# Patient Record
Sex: MALE | Employment: FULL TIME | URBAN - METROPOLITAN AREA
[De-identification: names, ages, dates, MRNs, and addresses within clinical notes are randomized per-mention and may not be internally consistent; named-entity substitution may affect disease eponyms.]

---

## 2024-03-25 ENCOUNTER — LAB (OUTPATIENT)
Dept: LAB | Facility: LAB | Age: 24
End: 2024-03-25
Payer: COMMERCIAL

## 2024-03-25 ENCOUNTER — OFFICE VISIT (OUTPATIENT)
Dept: PRIMARY CARE | Facility: CLINIC | Age: 24
End: 2024-03-25
Payer: COMMERCIAL

## 2024-03-25 ENCOUNTER — HOSPITAL ENCOUNTER (OUTPATIENT)
Dept: RADIOLOGY | Facility: CLINIC | Age: 24
Discharge: HOME | End: 2024-03-25
Payer: COMMERCIAL

## 2024-03-25 VITALS
WEIGHT: 224 LBS | HEIGHT: 71 IN | BODY MASS INDEX: 31.36 KG/M2 | SYSTOLIC BLOOD PRESSURE: 126 MMHG | DIASTOLIC BLOOD PRESSURE: 82 MMHG | OXYGEN SATURATION: 98 % | HEART RATE: 87 BPM

## 2024-03-25 DIAGNOSIS — R06.09 DYSPNEA ON EXERTION: ICD-10-CM

## 2024-03-25 DIAGNOSIS — N48.5 ULCER OF PENIS: ICD-10-CM

## 2024-03-25 DIAGNOSIS — Z00.00 HEALTH CARE MAINTENANCE: ICD-10-CM

## 2024-03-25 DIAGNOSIS — Z00.00 HEALTH CARE MAINTENANCE: Primary | ICD-10-CM

## 2024-03-25 DIAGNOSIS — G47.19 DAYTIME HYPERSOMNOLENCE: ICD-10-CM

## 2024-03-25 LAB
ALBUMIN SERPL BCP-MCNC: 4.6 G/DL (ref 3.4–5)
ALP SERPL-CCNC: 83 U/L (ref 33–120)
ALT SERPL W P-5'-P-CCNC: 21 U/L (ref 10–52)
ANION GAP SERPL CALC-SCNC: 13 MMOL/L (ref 10–20)
APPEARANCE UR: CLEAR
AST SERPL W P-5'-P-CCNC: 16 U/L (ref 9–39)
BASOPHILS # BLD AUTO: 0.02 X10*3/UL (ref 0–0.1)
BASOPHILS NFR BLD AUTO: 0.3 %
BILIRUB SERPL-MCNC: 0.7 MG/DL (ref 0–1.2)
BILIRUB UR STRIP.AUTO-MCNC: NEGATIVE MG/DL
BUN SERPL-MCNC: 9 MG/DL (ref 6–23)
CALCIUM SERPL-MCNC: 10 MG/DL (ref 8.6–10.6)
CHLORIDE SERPL-SCNC: 103 MMOL/L (ref 98–107)
CHOLEST SERPL-MCNC: 207 MG/DL (ref 0–199)
CHOLESTEROL/HDL RATIO: 4.7
CO2 SERPL-SCNC: 29 MMOL/L (ref 21–32)
COLOR UR: YELLOW
CREAT SERPL-MCNC: 0.97 MG/DL (ref 0.5–1.3)
EGFRCR SERPLBLD CKD-EPI 2021: >90 ML/MIN/1.73M*2
EOSINOPHIL # BLD AUTO: 0.04 X10*3/UL (ref 0–0.7)
EOSINOPHIL NFR BLD AUTO: 0.6 %
ERYTHROCYTE [DISTWIDTH] IN BLOOD BY AUTOMATED COUNT: 12.8 % (ref 11.5–14.5)
EST. AVERAGE GLUCOSE BLD GHB EST-MCNC: 105 MG/DL
GLUCOSE SERPL-MCNC: 105 MG/DL (ref 74–99)
GLUCOSE UR STRIP.AUTO-MCNC: NORMAL MG/DL
HBA1C MFR BLD: 5.3 %
HBV CORE IGM SER QL: NONREACTIVE
HBV SURFACE AB SER-ACNC: 402 MIU/ML
HBV SURFACE AG SERPL QL IA: NONREACTIVE
HCT VFR BLD AUTO: 43.4 % (ref 41–52)
HCV AB SER QL: REACTIVE
HDLC SERPL-MCNC: 43.6 MG/DL
HERPES SIMPLEX VIRUS 1 IGG: >8 INDEX
HERPES SIMPLEX VIRUS 2 IGG: >8 INDEX
HGB BLD-MCNC: 15.1 G/DL (ref 13.5–17.5)
HIV 1+2 AB+HIV1 P24 AG SERPL QL IA: NONREACTIVE
IMM GRANULOCYTES # BLD AUTO: 0.01 X10*3/UL (ref 0–0.7)
IMM GRANULOCYTES NFR BLD AUTO: 0.2 % (ref 0–0.9)
KETONES UR STRIP.AUTO-MCNC: NEGATIVE MG/DL
LDLC SERPL CALC-MCNC: 136 MG/DL
LEUKOCYTE ESTERASE UR QL STRIP.AUTO: NEGATIVE
LYMPHOCYTES # BLD AUTO: 1.76 X10*3/UL (ref 1.2–4.8)
LYMPHOCYTES NFR BLD AUTO: 26.7 %
MCH RBC QN AUTO: 29.1 PG (ref 26–34)
MCHC RBC AUTO-ENTMCNC: 34.8 G/DL (ref 32–36)
MCV RBC AUTO: 84 FL (ref 80–100)
MONOCYTES # BLD AUTO: 0.55 X10*3/UL (ref 0.1–1)
MONOCYTES NFR BLD AUTO: 8.3 %
MUCOUS THREADS #/AREA URNS AUTO: NORMAL /LPF
NEUTROPHILS # BLD AUTO: 4.21 X10*3/UL (ref 1.2–7.7)
NEUTROPHILS NFR BLD AUTO: 63.9 %
NITRITE UR QL STRIP.AUTO: NEGATIVE
NON HDL CHOLESTEROL: 163 MG/DL (ref 0–149)
NRBC BLD-RTO: 0 /100 WBCS (ref 0–0)
PH UR STRIP.AUTO: 5.5 [PH]
PLATELET # BLD AUTO: 208 X10*3/UL (ref 150–450)
POTASSIUM SERPL-SCNC: 4 MMOL/L (ref 3.5–5.3)
PROT SERPL-MCNC: 7.7 G/DL (ref 6.4–8.2)
PROT UR STRIP.AUTO-MCNC: NORMAL MG/DL
RBC # BLD AUTO: 5.19 X10*6/UL (ref 4.5–5.9)
RBC # UR STRIP.AUTO: NEGATIVE /UL
RBC #/AREA URNS AUTO: NORMAL /HPF
SODIUM SERPL-SCNC: 141 MMOL/L (ref 136–145)
SP GR UR STRIP.AUTO: 1.03
T4 FREE SERPL-MCNC: 1.3 NG/DL (ref 0.78–1.48)
TREPONEMA PALLIDUM IGG+IGM AB [PRESENCE] IN SERUM OR PLASMA BY IMMUNOASSAY: NONREACTIVE
TRIGL SERPL-MCNC: 137 MG/DL (ref 0–149)
TSH SERPL-ACNC: 1.03 MIU/L (ref 0.44–3.98)
UROBILINOGEN UR STRIP.AUTO-MCNC: NORMAL MG/DL
VLDL: 27 MG/DL (ref 0–40)
WBC # BLD AUTO: 6.6 X10*3/UL (ref 4.4–11.3)
WBC #/AREA URNS AUTO: NORMAL /HPF

## 2024-03-25 PROCEDURE — 86038 ANTINUCLEAR ANTIBODIES: CPT

## 2024-03-25 PROCEDURE — 80061 LIPID PANEL: CPT

## 2024-03-25 PROCEDURE — 85025 COMPLETE CBC W/AUTO DIFF WBC: CPT

## 2024-03-25 PROCEDURE — 86706 HEP B SURFACE ANTIBODY: CPT

## 2024-03-25 PROCEDURE — 81001 URINALYSIS AUTO W/SCOPE: CPT

## 2024-03-25 PROCEDURE — 71046 X-RAY EXAM CHEST 2 VIEWS: CPT | Performed by: RADIOLOGY

## 2024-03-25 PROCEDURE — 87522 HEPATITIS C REVRS TRNSCRPJ: CPT

## 2024-03-25 PROCEDURE — 87389 HIV-1 AG W/HIV-1&-2 AB AG IA: CPT

## 2024-03-25 PROCEDURE — 87800 DETECT AGNT MULT DNA DIREC: CPT

## 2024-03-25 PROCEDURE — 86780 TREPONEMA PALLIDUM: CPT

## 2024-03-25 PROCEDURE — 87086 URINE CULTURE/COLONY COUNT: CPT

## 2024-03-25 PROCEDURE — 36415 COLL VENOUS BLD VENIPUNCTURE: CPT

## 2024-03-25 PROCEDURE — 83036 HEMOGLOBIN GLYCOSYLATED A1C: CPT

## 2024-03-25 PROCEDURE — 86695 HERPES SIMPLEX TYPE 1 TEST: CPT

## 2024-03-25 PROCEDURE — 87340 HEPATITIS B SURFACE AG IA: CPT

## 2024-03-25 PROCEDURE — 84439 ASSAY OF FREE THYROXINE: CPT

## 2024-03-25 PROCEDURE — 86803 HEPATITIS C AB TEST: CPT

## 2024-03-25 PROCEDURE — 86705 HEP B CORE ANTIBODY IGM: CPT

## 2024-03-25 PROCEDURE — 99204 OFFICE O/P NEW MOD 45 MIN: CPT | Performed by: INTERNAL MEDICINE

## 2024-03-25 PROCEDURE — 71046 X-RAY EXAM CHEST 2 VIEWS: CPT

## 2024-03-25 PROCEDURE — 80053 COMPREHEN METABOLIC PANEL: CPT

## 2024-03-25 PROCEDURE — 1036F TOBACCO NON-USER: CPT | Performed by: INTERNAL MEDICINE

## 2024-03-25 PROCEDURE — 84443 ASSAY THYROID STIM HORMONE: CPT

## 2024-03-25 PROCEDURE — 86696 HERPES SIMPLEX TYPE 2 TEST: CPT

## 2024-03-25 RX ORDER — FLUCONAZOLE 150 MG/1
TABLET ORAL
COMMUNITY
Start: 2024-03-19

## 2024-03-25 RX ORDER — KETOCONAZOLE 20 MG/ML
SHAMPOO, SUSPENSION TOPICAL
COMMUNITY
Start: 2024-03-19

## 2024-03-25 RX ORDER — VALACYCLOVIR HYDROCHLORIDE 1 G/1
1000 TABLET, FILM COATED ORAL 2 TIMES DAILY
Qty: 14 TABLET | Refills: 2 | Status: SHIPPED | OUTPATIENT
Start: 2024-03-25 | End: 2024-04-01

## 2024-03-25 RX ORDER — FLUOCINOLONE ACETONIDE 0.11 MG/ML
OIL TOPICAL
COMMUNITY
Start: 2024-03-16

## 2024-03-25 ASSESSMENT — PATIENT HEALTH QUESTIONNAIRE - PHQ9
SUM OF ALL RESPONSES TO PHQ9 QUESTIONS 1 AND 2: 0
2. FEELING DOWN, DEPRESSED OR HOPELESS: NOT AT ALL
1. LITTLE INTEREST OR PLEASURE IN DOING THINGS: NOT AT ALL

## 2024-03-25 NOTE — PROGRESS NOTES
Subjective   Patient ID: Jona Drew is a 23 y.o. male who presents for New Patient Visit, Fatigue (Would like to discuss possible sleep apnea.), and std testing.  HPI          Subjective   Patient ID: Jona Drew is a 23 y.o. male who presents for New Patient Visit, Fatigue (Would like to discuss possible sleep apnea.), and std testing.  HPI  Past medical history chronic decreased vision in the right eye amblyopia    Patient describes an ulcer on the distal part of his penis for several weeks getting better with time grade 2 out of 10 worse when he tries to retract his foreskin causes some pain this is gotten better though no pain presently  He is sexually active with multiple partners sometimes uses protection  Denies any discharge genital dysuria hematuria change in frequency abdominal pain nausea vomiting              Health Maintenance:      Colonoscopy:      Mammogram:      Pelvic/Pap:      Low dose chest CT:      Aorta duplex:      Optho:      Podiatry:        Vaccines:      Refer to Epic Vaccination Log        ROS:      General: denies fever/chills/weight loss      Head: denies HA/trauma/masses/dizziness      Eyes: Chronic decreased vision on the right eye since a child denies vision change/loss of vision/blurry vision/diplopia/eye pain      Ears: denies hearing loss/tinnitus/otalgia/otorrhea      Nose: denies nasal drainage/anosmia      Throat: denies dysphagia/odynophagia      Lymphatics: denies lymph node swelling      Breast: denies masses/discharge/dimpling/skin changes      Cardiac: denies CP/palpitations/orthopnea/PND      Pulmonary: Apnea episodes at night reported ; occasional dyspnea with exertion denies dyspnea/cough/wheezing      GI: denies abd pain/n/v/diarrhea/melena/hematochezia/hematemesis      : denies dysuria/hematuria/change frequency      Genital: Ulcer on his penis getting a bit better with time denies genital discharge/lesions      Skin: Dryness of his hands denies  "rashes/lesions/masses      MSK: Occasionally feels a popping sensation in the right knee denies weakness/swelling/edema/gait imbalance/pain      Neuro: denies paresthesias/seizures/dysarthria      Psych: denies depression/anxiety/suicidal or homicidal ideations            Objective   /82   Pulse 87   Ht 1.803 m (5' 11\")   Wt 102 kg (224 lb)   SpO2 98%   BMI 31.24 kg/m²      Physical Exam:     General: AO3, NAD     Head: atraumatic/NC     Eyes: EOMI/PERRLA. Negative APD     Ears: TM pearly gray, EAC clear. No lesions or erythema     Nose: symmetric nares, no discharge     Throat: trachea midline, uvula midline pink mucosa. No thyromegaly     Lymphatics: no cervical/supraclavicular/ant or posterior cervical adenopathy/axillary/inguinal adenopathy     Breast: not examined     Chest: no deformity or tenderness to palpation     Pulm: CTA b/l, no wheeze/rhonchi/rales. nonlabored     Cardiac: RRR +s1s2, no m/r/g.      GI: soft, NT/ND. Normoactive Bsx4. No rebound/guarding.     Rectal: not examined     Genital: Distal dorsal shaft of penis 1 mm superficial ulceration without surrounding erythema warmth     MSK: 5/5 strength UE LE. No edema/clubbing/cyanosis     Skin: no rashes/lesions     Vascular: 2+ palp DP PT radials b/l. Negative carotid bruit     Neuro: CNII-XII intact. No focal deficits. Reflexes 2/4 brachioradialis bicep tricep patellar achilles. Finger to nose intact.     Psych: appropriate mood/affect                    No results found for: \"BMPR1A\", \"CBCDIF\"      Assessment/Plan   Diagnoses and all orders for this visit:  Health care maintenance  -     CBC and Auto Differential; Future  -     Comprehensive Metabolic Panel; Future  -     Hemoglobin A1C; Future  -     Lipid Panel; Future  -     Thyroxine, Free; Future  -     Thyroid Stimulating Hormone; Future  -     Urinalysis with Reflex Microscopic; Future  -     Urine Culture; Future  -     HSV1 IgG and HSV2 IgG; Future  -     Syphilis Screen with " Reflex; Future  -     C. Trachomatis / N. Gonorrhoeae, Amplified Detection; Future  -     Hepatitis B Core Antibody, Igm; Future  -     Hepatitis B Surface Antigen; Future  -     Hepatitis B Surface Antibody; Future  -     Hepatitis C Antibody; Future  -     HIV 1/2 Antigen/Antibody Screen with Reflex to Confirmation; Future  -     HSV PCR, Skin/Mucosa; Future  -     KATTY with Reflex to HARRY; Future  Daytime hypersomnolence  Comments:  Rule out ERIKA  Orders:  -     In-Center Sleep Study (Non-Sleep Provider Only); Future  -     Referral to Adult Sleep Medicine; Future  Dyspnea on exertion  Comments:  Likely secondary to marijuana use Cronkhite is worse other  Orders:  -     XR chest 2 views; Future  -     Transthoracic Echo (TTE) Complete; Future  -     Complete Pulmonary Function Test Pre/Post Bronchodialator (Spirometry Pre/Post/DLCO/Lung Volumes); Future  Ulcer of penis  Comments:  Suspect HSV less likely other such as syphilis  Orders:  -     valACYclovir (Valtrex) 1 gram tablet; Take 1 tablet (1,000 mg) by mouth 2 times a day for 7 days.    Use lotion on the hands twice a day    Call follow-up with ophthalmologist    Thank you for making appointment today Jona    Please follow-up 3 months    Jese Ferreira DO, [facility]      Health Maintenance:      Colonoscopy:      Mammogram:      Pelvic/Pap:      Low dose chest CT:      Aorta duplex:      Optho:      Podiatry:        Vaccines:      Refer to Epic Vaccination Log        ROS:      General: denies fever/chills/weight loss      Head: denies HA/trauma/masses/dizziness      Eyes: denies vision change/loss of vision/blurry vision/diplopia/eye pain      Ears: denies hearing loss/tinnitus/otalgia/otorrhea      Nose: denies nasal drainage/anosmia      Throat: denies dysphagia/odynophagia      Lymphatics: denies lymph node swelling      Breast: denies masses/discharge/dimpling/skin changes      Cardiac: denies CP/palpitations/orthopnea/PND      Pulmonary: denies  "dyspnea/cough/wheezing      GI: denies abd pain/n/v/diarrhea/melena/hematochezia/hematemesis      : denies dysuria/hematuria/change frequency      Genital: denies genital discharge/lesions      Skin: denies rashes/lesions/masses      MSK: denies weakness/swelling/edema/gait imbalance/pain      Neuro: denies paresthesias/seizures/dysarthria      Psych: denies depression/anxiety/suicidal or homicidal ideations            Objective   /84   Pulse 110   Ht 1.803 m (5' 11\")   Wt 102 kg (224 lb)   SpO2 98%   BMI 31.24 kg/m²      Physical Exam:     General: AO3, NAD     Head: atraumatic/NC     Eyes: EOMI/PERRLA. Negative APD     Ears: TM pearly gray, EAC clear. No lesions or erythema     Nose: symmetric nares, no discharge     Throat: trachea midline, uvula midline pink mucosa. No thyromegaly     Lymphatics: no cervical/supraclavicular/ant or posterior cervical adenopathy/axillary/inguinal adenopathy     Breast: not examined     Chest: no deformity or tenderness to palpation     Pulm: CTA b/l, no wheeze/rhonchi/rales. nonlabored     Cardiac: RRR +s1s2, no m/r/g.      GI: soft, NT/ND. Normoactive Bsx4. No rebound/guarding.     Rectal: not examined     Genital: not examined     MSK: 5/5 strength UE LE. No edema/clubbing/cyanosis     Skin: no rashes/lesions     Vascular: 2+ palp DP PT radials b/l. Negative carotid bruit     Neuro: CNII-XII intact. No focal deficits. Reflexes 2/4 brachioradialis bicep tricep patellar achilles. Finger to nose intact.     Psych: appropriate mood/affect                    No results found for: \"BMPR1A\", \"CBCDIF\"      Assessment/Plan   Diagnoses and all orders for this visit:  Health care maintenance  -     CBC and Auto Differential; Future  -     Comprehensive Metabolic Panel; Future  -     Hemoglobin A1C; Future  -     Lipid Panel; Future  -     Thyroxine, Free; Future  -     Thyroid Stimulating Hormone; Future  -     Urinalysis with Reflex Microscopic; Future  -     Urine Culture; " Future  -     HSV1 IgG and HSV2 IgG; Future  -     Syphilis Screen with Reflex; Future  -     C. Trachomatis / N. Gonorrhoeae, Amplified Detection; Future  -     Hepatitis B Core Antibody, Igm; Future  -     Hepatitis B Surface Antigen; Future  -     Hepatitis B Surface Antibody; Future  -     Hepatitis C Antibody; Future  -     HIV 1/2 Antigen/Antibody Screen with Reflex to Confirmation; Future  -     HSV PCR, Skin/Mucosa; Future  -     KATTY with Reflex to HARRY; Future  Daytime hypersomnolence  Comments:  Rule out ERIKA  Orders:  -     In-Center Sleep Study (Non-Sleep Provider Only); Future  -     Referral to Adult Sleep Medicine; Future  Dyspnea on exertion  Comments:  Likely secondary to marijuana use Cronkhite is worse other  Orders:  -     XR chest 2 views; Future  -     Transthoracic Echo (TTE) Complete; Future  -     Complete Pulmonary Function Test Pre/Post Bronchodialator (Spirometry Pre/Post/DLCO/Lung Volumes); Future  Ulcer of penis  Comments:  Suspect HSV less likely other such as syphilis  Orders:  -     valACYclovir (Valtrex) 1 gram tablet; Take 1 tablet (1,000 mg) by mouth 2 times a day for 7 days.           Ketty Archer MA

## 2024-03-25 NOTE — PROGRESS NOTES
Subjective   Patient ID: Jona Drew is a 23 y.o. male who presents for New Patient Visit, Fatigue (Would like to discuss possible sleep apnea.), and std testing.  HPI  Past medical history chronic decreased vision in the right eye amblyopia    Patient describes an ulcer on the distal part of his penis for several weeks getting better with time grade 2 out of 10 worse when he tries to retract his foreskin causes some pain this is gotten better though no pain presently  He is sexually active with multiple partners sometimes uses protection  Denies any discharge genital dysuria hematuria change in frequency abdominal pain nausea vomiting              Health Maintenance:      Colonoscopy:      Mammogram:      Pelvic/Pap:      Low dose chest CT:      Aorta duplex:      Optho:      Podiatry:        Vaccines:      Refer to Epic Vaccination Log        ROS:      General: denies fever/chills/weight loss      Head: denies HA/trauma/masses/dizziness      Eyes: Chronic decreased vision on the right eye since a child denies vision change/loss of vision/blurry vision/diplopia/eye pain      Ears: denies hearing loss/tinnitus/otalgia/otorrhea      Nose: denies nasal drainage/anosmia      Throat: denies dysphagia/odynophagia      Lymphatics: denies lymph node swelling      Breast: denies masses/discharge/dimpling/skin changes      Cardiac: denies CP/palpitations/orthopnea/PND      Pulmonary: Apnea episodes at night reported ; occasional dyspnea with exertion denies dyspnea/cough/wheezing      GI: denies abd pain/n/v/diarrhea/melena/hematochezia/hematemesis      : denies dysuria/hematuria/change frequency      Genital: Ulcer on his penis getting a bit better with time denies genital discharge/lesions      Skin: Dryness of his hands denies rashes/lesions/masses      MSK: Occasionally feels a popping sensation in the right knee denies weakness/swelling/edema/gait imbalance/pain      Neuro: denies paresthesias/seizures/dysarthria       "Psych: denies depression/anxiety/suicidal or homicidal ideations            Objective   /82   Pulse 87   Ht 1.803 m (5' 11\")   Wt 102 kg (224 lb)   SpO2 98%   BMI 31.24 kg/m²      Physical Exam:     General: AO3, NAD     Head: atraumatic/NC     Eyes: EOMI/PERRLA. Negative APD     Ears: TM pearly gray, EAC clear. No lesions or erythema     Nose: symmetric nares, no discharge     Throat: trachea midline, uvula midline pink mucosa. No thyromegaly     Lymphatics: no cervical/supraclavicular/ant or posterior cervical adenopathy/axillary/inguinal adenopathy     Breast: not examined     Chest: no deformity or tenderness to palpation     Pulm: CTA b/l, no wheeze/rhonchi/rales. nonlabored     Cardiac: RRR +s1s2, no m/r/g.      GI: soft, NT/ND. Normoactive Bsx4. No rebound/guarding.     Rectal: not examined     Genital: Distal dorsal shaft of penis 1 mm superficial ulceration without surrounding erythema warmth     MSK: 5/5 strength UE LE. No edema/clubbing/cyanosis     Skin: no rashes/lesions     Vascular: 2+ palp DP PT radials b/l. Negative carotid bruit     Neuro: CNII-XII intact. No focal deficits. Reflexes 2/4 brachioradialis bicep tricep patellar achilles. Finger to nose intact.     Psych: appropriate mood/affect                    No results found for: \"BMPR1A\", \"CBCDIF\"      Assessment/Plan   Diagnoses and all orders for this visit:  Health care maintenance  -     CBC and Auto Differential; Future  -     Comprehensive Metabolic Panel; Future  -     Hemoglobin A1C; Future  -     Lipid Panel; Future  -     Thyroxine, Free; Future  -     Thyroid Stimulating Hormone; Future  -     Urinalysis with Reflex Microscopic; Future  -     Urine Culture; Future  -     HSV1 IgG and HSV2 IgG; Future  -     Syphilis Screen with Reflex; Future  -     C. Trachomatis / N. Gonorrhoeae, Amplified Detection; Future  -     Hepatitis B Core Antibody, Igm; Future  -     Hepatitis B Surface Antigen; Future  -     Hepatitis B Surface " Antibody; Future  -     Hepatitis C Antibody; Future  -     HIV 1/2 Antigen/Antibody Screen with Reflex to Confirmation; Future  -     HSV PCR, Skin/Mucosa; Future  -     KATTY with Reflex to HARRY; Future  Daytime hypersomnolence  Comments:  Rule out ERIKA  Orders:  -     In-Center Sleep Study (Non-Sleep Provider Only); Future  -     Referral to Adult Sleep Medicine; Future  Dyspnea on exertion  Comments:  Likely secondary to marijuana use Cronkhite is worse other  Orders:  -     XR chest 2 views; Future  -     Transthoracic Echo (TTE) Complete; Future  -     Complete Pulmonary Function Test Pre/Post Bronchodialator (Spirometry Pre/Post/DLCO/Lung Volumes); Future  Ulcer of penis  Comments:  Suspect HSV less likely other such as syphilis  Orders:  -     valACYclovir (Valtrex) 1 gram tablet; Take 1 tablet (1,000 mg) by mouth 2 times a day for 7 days.    Use lotion on the hands twice a day    Call follow-up with ophthalmologist    Thank you for making appointment today Jona    Please follow-up 3 months    Jese Ferreira DO, GISSELLE Ferreira DO

## 2024-03-26 LAB
ANA SER QL HEP2 SUBST: NEGATIVE
BACTERIA UR CULT: NORMAL
C TRACH RRNA SPEC QL NAA+PROBE: NEGATIVE
HCV RNA SERPL NAA+PROBE-ACNC: NOT DETECTED K[IU]/ML
HCV RNA SERPL NAA+PROBE-LOG IU: NORMAL {LOG_IU}/ML
N GONORRHOEA DNA SPEC QL PROBE+SIG AMP: NEGATIVE

## 2024-04-08 ENCOUNTER — HOSPITAL ENCOUNTER (OUTPATIENT)
Dept: RESPIRATORY THERAPY | Facility: HOSPITAL | Age: 24
End: 2024-04-08
Payer: COMMERCIAL

## 2024-04-08 ENCOUNTER — APPOINTMENT (OUTPATIENT)
Dept: CARDIOLOGY | Facility: HOSPITAL | Age: 24
End: 2024-04-08
Payer: COMMERCIAL

## 2024-04-13 ENCOUNTER — HOSPITAL ENCOUNTER (EMERGENCY)
Facility: HOSPITAL | Age: 24
Discharge: HOME | End: 2024-04-14
Attending: EMERGENCY MEDICINE
Payer: COMMERCIAL

## 2024-04-13 ENCOUNTER — APPOINTMENT (OUTPATIENT)
Dept: RADIOLOGY | Facility: HOSPITAL | Age: 24
End: 2024-04-13
Payer: COMMERCIAL

## 2024-04-13 VITALS
HEIGHT: 72 IN | OXYGEN SATURATION: 99 % | HEART RATE: 92 BPM | TEMPERATURE: 96.8 F | SYSTOLIC BLOOD PRESSURE: 130 MMHG | BODY MASS INDEX: 30.34 KG/M2 | DIASTOLIC BLOOD PRESSURE: 92 MMHG | WEIGHT: 224 LBS | RESPIRATION RATE: 18 BRPM

## 2024-04-13 DIAGNOSIS — D16.9 OSTEOID OSTEOMA: ICD-10-CM

## 2024-04-13 DIAGNOSIS — M25.562 ACUTE PAIN OF LEFT KNEE: Primary | ICD-10-CM

## 2024-04-13 PROCEDURE — 85025 COMPLETE CBC W/AUTO DIFF WBC: CPT | Performed by: EMERGENCY MEDICINE

## 2024-04-13 PROCEDURE — 73562 X-RAY EXAM OF KNEE 3: CPT | Mod: LT

## 2024-04-13 PROCEDURE — 86140 C-REACTIVE PROTEIN: CPT | Performed by: EMERGENCY MEDICINE

## 2024-04-13 PROCEDURE — 84075 ASSAY ALKALINE PHOSPHATASE: CPT | Performed by: EMERGENCY MEDICINE

## 2024-04-13 PROCEDURE — 20611 DRAIN/INJ JOINT/BURSA W/US: CPT | Performed by: STUDENT IN AN ORGANIZED HEALTH CARE EDUCATION/TRAINING PROGRAM

## 2024-04-13 PROCEDURE — 36415 COLL VENOUS BLD VENIPUNCTURE: CPT | Performed by: EMERGENCY MEDICINE

## 2024-04-13 PROCEDURE — 99284 EMERGENCY DEPT VISIT MOD MDM: CPT | Mod: 25

## 2024-04-13 PROCEDURE — 85652 RBC SED RATE AUTOMATED: CPT | Performed by: EMERGENCY MEDICINE

## 2024-04-13 RX ORDER — LIDOCAINE HYDROCHLORIDE 10 MG/ML
5 INJECTION INFILTRATION; PERINEURAL ONCE
Status: DISCONTINUED | OUTPATIENT
Start: 2024-04-13 | End: 2024-04-14

## 2024-04-13 ASSESSMENT — COLUMBIA-SUICIDE SEVERITY RATING SCALE - C-SSRS
2. HAVE YOU ACTUALLY HAD ANY THOUGHTS OF KILLING YOURSELF?: NO
6. HAVE YOU EVER DONE ANYTHING, STARTED TO DO ANYTHING, OR PREPARED TO DO ANYTHING TO END YOUR LIFE?: NO
1. IN THE PAST MONTH, HAVE YOU WISHED YOU WERE DEAD OR WISHED YOU COULD GO TO SLEEP AND NOT WAKE UP?: NO

## 2024-04-14 ENCOUNTER — APPOINTMENT (OUTPATIENT)
Dept: RADIOLOGY | Facility: HOSPITAL | Age: 24
End: 2024-04-14
Payer: COMMERCIAL

## 2024-04-14 PROBLEM — M89.9 BONE LESION: Status: ACTIVE | Noted: 2024-04-14

## 2024-04-14 LAB
ALBUMIN SERPL BCP-MCNC: 4.5 G/DL (ref 3.4–5)
ALP SERPL-CCNC: 101 U/L (ref 33–120)
ALT SERPL W P-5'-P-CCNC: 23 U/L (ref 10–52)
ANION GAP SERPL CALC-SCNC: 11 MMOL/L (ref 10–20)
AST SERPL W P-5'-P-CCNC: 26 U/L (ref 9–39)
BASOPHILS # BLD AUTO: 0.03 X10*3/UL (ref 0–0.1)
BASOPHILS NFR BLD AUTO: 0.3 %
BILIRUB SERPL-MCNC: 0.4 MG/DL (ref 0–1.2)
BUN SERPL-MCNC: 11 MG/DL (ref 6–23)
CALCIUM SERPL-MCNC: 9.7 MG/DL (ref 8.6–10.6)
CHLORIDE SERPL-SCNC: 102 MMOL/L (ref 98–107)
CO2 SERPL-SCNC: 30 MMOL/L (ref 21–32)
CREAT SERPL-MCNC: 0.94 MG/DL (ref 0.5–1.3)
CRP SERPL-MCNC: 1.09 MG/DL
EGFRCR SERPLBLD CKD-EPI 2021: >90 ML/MIN/1.73M*2
EOSINOPHIL # BLD AUTO: 0.08 X10*3/UL (ref 0–0.7)
EOSINOPHIL NFR BLD AUTO: 0.9 %
ERYTHROCYTE [DISTWIDTH] IN BLOOD BY AUTOMATED COUNT: 12.7 % (ref 11.5–14.5)
ERYTHROCYTE [SEDIMENTATION RATE] IN BLOOD BY WESTERGREN METHOD: 30 MM/H (ref 0–15)
GLUCOSE SERPL-MCNC: 100 MG/DL (ref 74–99)
HCT VFR BLD AUTO: 42.9 % (ref 41–52)
HGB BLD-MCNC: 14.6 G/DL (ref 13.5–17.5)
IMM GRANULOCYTES # BLD AUTO: 0.02 X10*3/UL (ref 0–0.7)
IMM GRANULOCYTES NFR BLD AUTO: 0.2 % (ref 0–0.9)
LYMPHOCYTES # BLD AUTO: 2.62 X10*3/UL (ref 1.2–4.8)
LYMPHOCYTES NFR BLD AUTO: 30.5 %
MCH RBC QN AUTO: 28.7 PG (ref 26–34)
MCHC RBC AUTO-ENTMCNC: 34 G/DL (ref 32–36)
MCV RBC AUTO: 84 FL (ref 80–100)
MONOCYTES # BLD AUTO: 0.69 X10*3/UL (ref 0.1–1)
MONOCYTES NFR BLD AUTO: 8 %
NEUTROPHILS # BLD AUTO: 5.15 X10*3/UL (ref 1.2–7.7)
NEUTROPHILS NFR BLD AUTO: 60.1 %
NRBC BLD-RTO: 0 /100 WBCS (ref 0–0)
PLATELET # BLD AUTO: 215 X10*3/UL (ref 150–450)
POTASSIUM SERPL-SCNC: 3.4 MMOL/L (ref 3.5–5.3)
PROT SERPL-MCNC: 8.3 G/DL (ref 6.4–8.2)
RBC # BLD AUTO: 5.09 X10*6/UL (ref 4.5–5.9)
SODIUM SERPL-SCNC: 140 MMOL/L (ref 136–145)
WBC # BLD AUTO: 8.6 X10*3/UL (ref 4.4–11.3)

## 2024-04-14 PROCEDURE — 87800 DETECT AGNT MULT DNA DIREC: CPT | Performed by: EMERGENCY MEDICINE

## 2024-04-14 PROCEDURE — 73700 CT LOWER EXTREMITY W/O DYE: CPT | Mod: LEFT SIDE | Performed by: RADIOLOGY

## 2024-04-14 PROCEDURE — 2500000005 HC RX 250 GENERAL PHARMACY W/O HCPCS: Performed by: EMERGENCY MEDICINE

## 2024-04-14 PROCEDURE — 73700 CT LOWER EXTREMITY W/O DYE: CPT | Mod: LT

## 2024-04-14 PROCEDURE — 73562 X-RAY EXAM OF KNEE 3: CPT | Mod: LEFT SIDE | Performed by: RADIOLOGY

## 2024-04-14 RX ORDER — MELOXICAM 7.5 MG/1
7.5 TABLET ORAL DAILY
Qty: 15 TABLET | Refills: 0 | Status: SHIPPED | OUTPATIENT
Start: 2024-04-14 | End: 2024-04-29

## 2024-04-14 RX ORDER — LIDOCAINE HYDROCHLORIDE 10 MG/ML
5 INJECTION INFILTRATION; PERINEURAL ONCE
Status: COMPLETED | OUTPATIENT
Start: 2024-04-14 | End: 2024-04-14

## 2024-04-14 RX ADMIN — LIDOCAINE HYDROCHLORIDE 50 MG: 10 INJECTION, SOLUTION INFILTRATION; PERINEURAL at 00:04

## 2024-04-14 NOTE — PROGRESS NOTES
Emergency Medicine Transition of Care Note.    I received Jona Drew in signout from previous team.  Please see the previous ED provider note for all HPI, PE and MDM up to the time of signout at 7 AM. This is in addition to the primary record.    In brief Jona Drew is an 23 y.o. male presenting for atraumatic left knee pain.  Patient was seen at urgent care yesterday and was told there was concern for septic arthritis which prompted his presentation to this ED.  He denied injury or trauma to the knee.  CBC, CMP, CRP, ESR, and gonorrhea/trichomonas labs were obtained.  CBC and CMP were grossly unremarkable.  CRP mildly elevated at 1.09.  ESR also mildly elevated at 30.  A tap was attempted on the left knee which was negative.  X-ray revealed a cortical irregularity of the inferolateral aspect of the patella which raise concern for fracture.  Therefore, a CT was obtained that showed an acute fracture of the patella and thickening of the quadriceps tendon.  Given these findings in the setting of no injury or trauma to the knee, a consult to Ortho was placed.  At the time of signout we were awaiting: Ortho recs.    Diagnoses as of 04/14/24 0821   Acute pain of left knee   Osteoid osteoma       Labs Reviewed   COMPREHENSIVE METABOLIC PANEL - Abnormal       Result Value    Glucose 100 (*)     Sodium 140      Potassium 3.4 (*)     Chloride 102      Bicarbonate 30      Anion Gap 11      Urea Nitrogen 11      Creatinine 0.94      eGFR >90      Calcium 9.7      Albumin 4.5      Alkaline Phosphatase 101      Total Protein 8.3 (*)     AST 26      Bilirubin, Total 0.4      ALT 23     C-REACTIVE PROTEIN - Abnormal    C-Reactive Protein 1.09 (*)    SEDIMENTATION RATE, AUTOMATED - Abnormal    Sedimentation Rate 30 (*)    CBC WITH AUTO DIFFERENTIAL    WBC 8.6      nRBC 0.0      RBC 5.09      Hemoglobin 14.6      Hematocrit 42.9      MCV 84      MCH 28.7      MCHC 34.0      RDW 12.7      Platelets 215      Neutrophils % 60.1       Immature Granulocytes %, Automated 0.2      Lymphocytes % 30.5      Monocytes % 8.0      Eosinophils % 0.9      Basophils % 0.3      Neutrophils Absolute 5.15      Immature Granulocytes Absolute, Automated 0.02      Lymphocytes Absolute 2.62      Monocytes Absolute 0.69      Eosinophils Absolute 0.08      Basophils Absolute 0.03     C. TRACHOMATIS + N. GONORRHOEAE, AMPLIFIED       CT knee left wo IV contrast   Final Result   1. Acute fracture of the patella with osseous defects superiorly and   fracture line extending to the anterior surface.   2. Lipohemarthrosis of the left knee joint.   3. Thickening of the quadriceps tendon along the patellar attachment   with adjacent stranding and fluid concerning for a quadriceps tendon   injury.        I personally reviewed the images/study and I agree with the findings   as stated above by resident physician, Bert Crane MD. This study   was interpreted at [place], [place].        MACRO:   None.        Signed by: Evan Finkelstein 4/14/2024 3:27 AM   Dictation workstation:   [id number]      XR knee left 3 views   Final Result   Cortical irregularity involving the inferolateral aspect of the   patella best seen on AP view concerning for fracture. Moderate to   large suprapatellar joint effusion. High riding patella which may   represent a patellar tendon injury. Correlate with knee extension.             MACRO:   None.        Signed by: Evan Finkelstein 4/14/2024 12:23 AM   Dictation workstation:   [id number]      Point of Care Ultrasound    (Results Pending)         Medical Decision Making  Ortho concluded the finding in the patient's left knee is likely an osteoid osteoma.  Please see their consult note for more details.  They recommended a course of NSAIDs.  The patient stated he did not get relief with Advil, therefore Ortho recommended meloxicam.  Patient was written a prescription for meloxicam to take daily for the  next 2 weeks.  The Ortho team would like the patient to follow-up with Dr. Freeman in about 2 weeks, especially if symptoms persist.  Patient was provided their phone number to schedule an appointment.  He was otherwise educated on signs and symptoms that would warrant returning to the ED.  He is agreeable and understanding to the plan and all of his questions were answered to satisfaction.        Final diagnoses:   [M25.562] Acute pain of left knee   [D16.9] Osteoid osteoma           Procedure  Procedures    Ronna North PA-C

## 2024-04-14 NOTE — ED TRIAGE NOTES
KENYETTA YEE is a 23y old M with no pertinent PMHx is presenting to [facility] ED with a chief complaint of L leg pain. Pt states he was seen at urgent care and was told he has septic arthritis. He verbalizes slight swelling. Denies any recent falls or injuries to affected area. No recent sick contacts or COVID symptoms. NKDA

## 2024-04-14 NOTE — DISCHARGE INSTRUCTIONS
The imaging we obtained of your left knee showed a likely benign tumor.  This responds well to a group of medication called NSAIDs.  I sent a prescription to your pharmacy called meloxicam.  This is used for pain and will also help with the tumor.  You can take 1 tablet daily as needed.  Otherwise, the orthopedics department would like you to follow-up with Dr. Freeman in about 2 weeks, especially if symptoms persist.  You can call and schedule an appointment.  Their phone number is [phone].

## 2024-04-14 NOTE — ED PROVIDER NOTES
HPI   Chief Complaint   Patient presents with    Leg Pain       23-year-old male with no significant medical history presenting to the ED for left knee pain.  Patient states this started about 3 weeks ago when he noticed it after waking up.  His knee has become progressively more painful and he noticed warmth and difficulty bending the knee.  Yesterday he went to urgent care and they were concerned for septic arthritis but was ultimately discharged without any intervention.  States that his knee is also swollen.  He is able to bear weight and ambulate but is unable to fully bend the knee.  Denies fevers, chills, sweats or other systemic symptoms.  Denies injuries to the knee.  No history of gout.                          [place] Coma Scale Score: 15                     Patient History   No past medical history on file.  No past surgical history on file.  No family history on file.  Social History     Tobacco Use    Smoking status: Never    Smokeless tobacco: Never   Substance Use Topics    Alcohol use: Yes    Drug use: Yes     Types: Marijuana       Physical Exam   ED Triage Vitals [04/13/24 2258]   Temperature Heart Rate Respirations BP   36 °C (96.8 °F) 92 18 (!) 130/92      Pulse Ox Temp Source Heart Rate Source Patient Position   99 % Temporal Monitor Sitting      BP Location FiO2 (%)     Right arm --       Physical Exam  Vitals and nursing note reviewed.   Constitutional:       General: He is not in acute distress.     Appearance: He is not ill-appearing or toxic-appearing.   HENT:      Head: Normocephalic and atraumatic.      Nose: Nose normal.      Mouth/Throat:      Mouth: Mucous membranes are moist.   Eyes:      Extraocular Movements: Extraocular movements intact.      Conjunctiva/sclera: Conjunctivae normal.   Cardiovascular:      Rate and Rhythm: Normal rate and regular rhythm.      Pulses: Normal pulses.      Heart sounds: Normal heart sounds.   Pulmonary:      Effort: Pulmonary effort is normal.       Breath sounds: Normal breath sounds.   Abdominal:      General: There is no distension.      Palpations: Abdomen is soft.      Tenderness: There is no abdominal tenderness.   Musculoskeletal:         General: Swelling and tenderness present.      Cervical back: Normal range of motion and neck supple.      Right lower leg: No edema.      Left lower leg: No edema.      Comments: Tenderness to palpation of the anterior superior left knee.  There is edema present.  Limited range of motion with knee flexion.  There is warmth without erythema.  Neurovascularly intact.  2+ DP pulses.   Skin:     General: Skin is warm and dry.      Capillary Refill: Capillary refill takes less than 2 seconds.   Neurological:      General: No focal deficit present.      Mental Status: He is alert and oriented to person, place, and time. Mental status is at baseline.         ED Course & MDM   Diagnoses as of 04/14/24 0127   Acute pain of left knee       Medical Decision Making  23-year-old male presenting to the ED for atraumatic left knee pain and swelling and difficulty bending.  Patient is nontoxic-appearing and afebrile.  He does not have systemic symptoms.  On exam does have edema, warmth and pain with flexion of the left knee.  Concerning for septic arthritis versus gout versus ligamentous/tendinous injury versus underlying fracture.    X-ray obtained and independent review did not show evidence of osseous abnormalities.  Patient was consented for a joint aspiration for a septic arthritis rule out.  There is a small fluid collection seen on ultrasound however we were unable to aspirate any fluid.  Labs with inflammatory markers were obtained as well as urine gonorrhea to rule out gonococcal arthritis.    Final interpretation by radiology shows a cortical irregularity involving the inferior lateral aspect of the patella concerning for fracture and a high riding patella with suprapatellar joint effusion, concerning for patellar tendon  injury.  Clinically patient does not have a large effusion.  Patient is able to extend his knee but does have difficulty with flexion.  Discussed findings with radiology and recommendation was to obtain a CT for further evaluation as a sunrise view would not rule out a patellar fracture at that location.  CT of the left knee was ordered.  Patient signed out at shift change pending CT results.        Procedure  Arthrocentesis    Performed by: Dwayne Cruz DO  Authorized by: Joel Ramírez MD    Consent:     Consent obtained:  Written    Consent given by:  Patient    Risks, benefits, and alternatives were discussed: yes      Risks discussed:  Bleeding, infection, pain, incomplete drainage and nerve damage    Alternatives discussed:  No treatment  Universal protocol:     Patient identity confirmed:  Arm band  Location:     Location:  Knee    Knee:  L knee  Anesthesia:     Anesthesia method:  Local infiltration  Procedure details:     Needle gauge:  18 G    Ultrasound guidance: yes      Approach:  Lateral    Aspirate amount:  0    Steroid injected: no      Specimen collected: no    Post-procedure details:     Dressing:  Gauze roll    Procedure completion:  Tolerated well, no immediate complications       Dwayne Cruz DO  Resident  04/14/24 0127

## 2024-04-14 NOTE — CONSULTS
Orthopaedic Surgery Consult H&P    HPI:   Orthopaedic Problems/Injuries: L osteoid ostoma of patella  Other Injuries: none    23M (marijuana) pw above after unknown mechanism. Claims he woke up w L knee pain 3 wks ago.     PMH: per above/EMR  PSH: per above/EMR  SocHx:      -  Denies tobacco use      -  Denies EtOH use      -  Denies other drug use  FamHx:  Non-contributory to this patient's acute orthopaedic problem.   Allergies: Reviewed in EMR  Meds: Reviewed in EMR    ROS      - 14 point ROS negative except as above    Physical Exam:  Gen: AOx3, NAD  HEENT: normocephalic atraumatic  Psych: appropriate mood and affect  Resp: nonlabored breathing  Cardiac: Extremities WWP, RRR to peripheral palpation  Neuro: CN 2-12 grossly intact  Skin: no rashes    Left lower extremity:  - Skin intact   - Tender to palpation over superior pole of patell  - extensor mechanism intact, able to straight leg raise  - Large knee effusion  - Fires EHL/DF/PF.  - Sensation intact to light touch in sural, saphenous, superficial/deep peroneal, tibial nerve distributions.  - 2+ DP pulse, < 2 seconds capillary refill.    A full secondary exam was performed and all relevant findings discussed and noted above.    Results for orders placed or performed during the hospital encounter of 04/13/24 (from the past 24 hour(s))   CBC and Auto Differential   Result Value Ref Range    WBC 8.6 4.4 - 11.3 x10*3/uL    nRBC 0.0 0.0 - 0.0 /100 WBCs    RBC 5.09 4.50 - 5.90 x10*6/uL    Hemoglobin 14.6 13.5 - 17.5 g/dL    Hematocrit 42.9 41.0 - 52.0 %    MCV 84 80 - 100 fL    MCH 28.7 26.0 - 34.0 pg    MCHC 34.0 32.0 - 36.0 g/dL    RDW 12.7 11.5 - 14.5 %    Platelets 215 150 - 450 x10*3/uL    Neutrophils % 60.1 40.0 - 80.0 %    Immature Granulocytes %, Automated 0.2 0.0 - 0.9 %    Lymphocytes % 30.5 13.0 - 44.0 %    Monocytes % 8.0 2.0 - 10.0 %    Eosinophils % 0.9 0.0 - 6.0 %    Basophils % 0.3 0.0 - 2.0 %    Neutrophils Absolute 5.15 1.20 - 7.70 x10*3/uL     Immature Granulocytes Absolute, Automated 0.02 0.00 - 0.70 x10*3/uL    Lymphocytes Absolute 2.62 1.20 - 4.80 x10*3/uL    Monocytes Absolute 0.69 0.10 - 1.00 x10*3/uL    Eosinophils Absolute 0.08 0.00 - 0.70 x10*3/uL    Basophils Absolute 0.03 0.00 - 0.10 x10*3/uL   Comprehensive metabolic panel   Result Value Ref Range    Glucose 100 (H) 74 - 99 mg/dL    Sodium 140 136 - 145 mmol/L    Potassium 3.4 (L) 3.5 - 5.3 mmol/L    Chloride 102 98 - 107 mmol/L    Bicarbonate 30 21 - 32 mmol/L    Anion Gap 11 10 - 20 mmol/L    Urea Nitrogen 11 6 - 23 mg/dL    Creatinine 0.94 0.50 - 1.30 mg/dL    eGFR >90 >60 mL/min/1.73m*2    Calcium 9.7 8.6 - 10.6 mg/dL    Albumin 4.5 3.4 - 5.0 g/dL    Alkaline Phosphatase 101 33 - 120 U/L    Total Protein 8.3 (H) 6.4 - 8.2 g/dL    AST 26 9 - 39 U/L    Bilirubin, Total 0.4 0.0 - 1.2 mg/dL    ALT 23 10 - 52 U/L   C-reactive protein   Result Value Ref Range    C-Reactive Protein 1.09 (H) <1.00 mg/dL   Sedimentation rate, automated   Result Value Ref Range    Sedimentation Rate 30 (H) 0 - 15 mm/h       CT knee left wo IV contrast   Final Result   1. Acute fracture of the patella with osseous defects superiorly and   fracture line extending to the anterior surface.   2. Lipohemarthrosis of the left knee joint.   3. Thickening of the quadriceps tendon along the patellar attachment   with adjacent stranding and fluid concerning for a quadriceps tendon   injury.        I personally reviewed the images/study and I agree with the findings   as stated above by resident physician, Bret Crane MD. This study   was interpreted at [facility],   [place], [place].        MACRO:   None.        Signed by: Evan Finkelstein 4/14/2024 3:27 AM   Dictation workstation:   [id number]      XR knee left 3 views   Final Result   Cortical irregularity involving the inferolateral aspect of the   patella best seen on AP view concerning for fracture. Moderate to   large suprapatellar  joint effusion. High riding patella which may   represent a patellar tendon injury. Correlate with knee extension.             MACRO:   None.        Signed by: Evan Finkelstein 4/14/2024 12:23 AM   Dictation workstation:   [id number]      Point of Care Ultrasound    (Results Pending)       Assessment:  Orthopaedic Problems/Injuries: L osteoid ostoma of patella  Other Injuries: none    23M (marijuana) pw above after unknown mechanism. Claims he woke up w L knee pain 3 wks ago. On exam, closed, NVI. Able to straight leg raise. Large left knee effusion. TTP superior pole of patella. XRs/CT w above. Placed in KI.    Plan:  -  No acute orthopaedic intervention  - trial meloxicam for pain control  - WB: WBAT      - Dispo: Follow up with Dr Freeman in two weeks    Wili Hinson MD  On Call Resident - PGY-2 Orthopedic Surgery  [facility]  Epic Message Preferred  Pager: [phone]    This patient was seen within 30 minutes of initial consult.  _________________________________________________________

## 2024-04-15 LAB
C TRACH RRNA SPEC QL NAA+PROBE: NEGATIVE
N GONORRHOEA DNA SPEC QL PROBE+SIG AMP: NEGATIVE

## 2024-04-17 ENCOUNTER — DOCUMENTATION (OUTPATIENT)
Dept: PRIMARY CARE | Facility: CLINIC | Age: 24
End: 2024-04-17
Payer: COMMERCIAL

## 2024-04-17 ENCOUNTER — APPOINTMENT (OUTPATIENT)
Dept: SLEEP MEDICINE | Facility: CLINIC | Age: 24
End: 2024-04-17
Payer: COMMERCIAL

## 2024-04-17 DIAGNOSIS — R40.0 DAYTIME SOMNOLENCE: Primary | ICD-10-CM

## 2024-04-17 DIAGNOSIS — N48.9 PENILE LESION: Primary | ICD-10-CM

## 2024-04-17 RX ORDER — VALACYCLOVIR HYDROCHLORIDE 1 G/1
1000 TABLET, FILM COATED ORAL 2 TIMES DAILY
Qty: 14 TABLET | Refills: 2 | Status: SHIPPED | OUTPATIENT
Start: 2024-04-17 | End: 2024-04-24

## 2024-04-17 NOTE — PROGRESS NOTES
Good morning yes I have placed HST order thank you  ===View-only below this line===  ----- Message -----  From: Chiqui Cortes  Sent: 4/16/2024  12:06 PM EDT  To: Jese Ferreira DO  Subject: In lab sleep study                               Good Morning,     Pts in lab sleep study was denied, I will call and inform pt.     Would you like for pt to get an HST done? If so, are you able to place the order for him.     Thank you,     Chiqui SPRINGER

## 2024-05-01 ENCOUNTER — APPOINTMENT (OUTPATIENT)
Dept: SLEEP MEDICINE | Facility: CLINIC | Age: 24
End: 2024-05-01
Payer: COMMERCIAL

## 2024-05-28 ENCOUNTER — APPOINTMENT (OUTPATIENT)
Dept: PRIMARY CARE | Facility: CLINIC | Age: 24
End: 2024-05-28
Payer: COMMERCIAL

## 2024-06-25 ENCOUNTER — APPOINTMENT (OUTPATIENT)
Dept: PRIMARY CARE | Facility: CLINIC | Age: 24
End: 2024-06-25
Payer: COMMERCIAL

## 2024-06-25 ENCOUNTER — DOCUMENTATION (OUTPATIENT)
Dept: PRIMARY CARE | Facility: CLINIC | Age: 24
End: 2024-06-25

## 2024-06-25 DIAGNOSIS — R06.81 APNEA: Primary | ICD-10-CM

## 2024-06-25 NOTE — PROGRESS NOTES
I would recommend the in lab sleep study, as per my original order. I only ordered HSAT because insurance refused initially.  I have re placed my original order so we can obtain accurate data for this patient thank you      ===View-only below this line===  ----- Message -----  From: Chiqui Cortes  Sent: 6/25/2024  10:28 AM EDT  To: Jese Ferreira, DO  Subject: Inconclusive Results                             Dear Dr. Ferreira:    Thank you for referring your patient to a  Sleep Testing center for their home sleep apnea test (HSAT).     Your patient recently had an inconclusive HSAT due to poor quality recording.     American Academy of Sleep Medicine (AASM) Guidelines typically recommend an in-center, overnight sleep test after an inconclusive attempt at an HSAT. However, we can attempt an HSAT one more time if there are special circumstances.     Given that, would you like us to re-attempt the HSAT or would you like to order an in-lab sleep study?    If you would like an in-lab sleep study, please place the order.    If you would like a repeat HSAT, no new order is needed.    Please let us know how you would like us to proceed.    One of our caregivers will reach out to schedule your patient's in-lab sleep study once the order is placed.     Kind regards,  The  Sleep Medicine Team

## 2024-06-27 ENCOUNTER — APPOINTMENT (OUTPATIENT)
Dept: PRIMARY CARE | Facility: CLINIC | Age: 24
End: 2024-06-27
Payer: COMMERCIAL

## 2024-06-27 VITALS
WEIGHT: 221 LBS | SYSTOLIC BLOOD PRESSURE: 126 MMHG | HEIGHT: 72 IN | BODY MASS INDEX: 29.93 KG/M2 | DIASTOLIC BLOOD PRESSURE: 82 MMHG

## 2024-06-27 DIAGNOSIS — R06.01 ORTHOPNEA: ICD-10-CM

## 2024-06-27 DIAGNOSIS — R06.00 DYSPNEA, UNSPECIFIED TYPE: Primary | ICD-10-CM

## 2024-06-27 DIAGNOSIS — S82.002S CLOSED NONDISPLACED FRACTURE OF LEFT PATELLA, UNSPECIFIED FRACTURE MORPHOLOGY, SEQUELA: ICD-10-CM

## 2024-06-27 PROCEDURE — 99395 PREV VISIT EST AGE 18-39: CPT | Performed by: INTERNAL MEDICINE

## 2024-06-27 NOTE — PROGRESS NOTES
Subjective   Patient ID: Jona Drew is a 23 y.o. male who presents for Annual Exam.  HPI  Past medical history chronic decreased vision in the right eye amblyopia, marijuana smoke use   ER presentation April 13, 2024 left knee pain aspiration knee CT of the left knee showed patellar fracture    Presents with friend Vivienne    Patient describes chronic apnea for at least a year at night it is witnessed he stops breathing grade 8 out of 10 nothing makes better or worse  Insurance refused the in lab sleep study initially, they authorized the home sleep study however this was nondiagnostic  He states he is trying to schedule for the lab study at this point for sleep study and they can't get him in until end of August  Orthopnea  Chronic dyspnea for years seems worse with marijuana smoking  Daytime somnolence  Denies nocturia morning headaches chest pain cough productive cough wheezing fever chills leg pain leg swelling PND        Health Maintenance:      Colonoscopy:      Mammogram:      Pelvic/Pap:      Low dose chest CT:      Aorta duplex:      Optho:      Podiatry:        Vaccines:      Refer to Epic Vaccination Log        ROS:      General: denies fever/chills/weight loss      Head: denies HA/trauma/masses/dizziness      Eyes: Chronic decreased vision on the right eye since a child denies vision change/loss of vision/blurry vision/diplopia/eye pain      Ears: denies hearing loss/tinnitus/otalgia/otorrhea      Nose: denies nasal drainage/anosmia      Throat: denies dysphagia/odynophagia      Lymphatics: denies lymph node swelling      Breast: denies masses/discharge/dimpling/skin changes      Cardiac: denies CP/palpitations/orthopnea/PND      Pulmonary: Apnea episodes at night reported persistent; occasional dyspnea with exertion persistent for years denies dyspnea/cough/wheezing      GI: denies abd pain/n/v/diarrhea/melena/hematochezia/hematemesis      : denies dysuria/hematuria/change frequency      Genital:   "denies genital discharge/lesions      Skin:  denies rashes/lesions/masses      MSK:  denies weakness/swelling/edema/gait imbalance/pain      Neuro: denies paresthesias/seizures/dysarthria      Psych: denies depression/anxiety/suicidal or homicidal ideations            Objective   /82   Ht 1.829 m (6')   Wt 100 kg (221 lb)   BMI 29.97 kg/m²      Physical Exam:     General: AO3, NAD     Head: atraumatic/NC     Eyes: 20/20 OU EOMI/PERRLA. Negative APD     Ears: TM pearly gray, EAC clear. No lesions or erythema     Nose: symmetric nares, no discharge     Throat: trachea midline, uvula midline pink mucosa. No thyromegaly     Lymphatics: no cervical/supraclavicular/ant or posterior cervical adenopathy/axillary/inguinal adenopathy     Breast: not examined     Chest: no deformity or tenderness to palpation     Pulm: CTA b/l, no wheeze/rhonchi/rales. nonlabored     Cardiac: RRR +s1s2, no m/r/g.      GI: soft, NT/ND. Normoactive Bsx4. No rebound/guarding.     Rectal: not examined     Genital: Not examined     MSK: 5/5 strength UE LE. No edema/clubbing/cyanosis     Skin: no rashes/lesions     Vascular: 2+ palp DP PT radials b/l. Negative carotid bruit     Neuro: CNII-XII intact. No focal deficits. Reflexes 2/4 brachioradialis bicep tricep patellar achilles. Finger to nose intact.     Psych: appropriate mood/affect                    No results found for: \"BMPR1A\", \"CBCDIF\"      Assessment/Plan   Diagnoses and all orders for this visit:  Dyspnea, unspecified type  Comments:  Suspect secondary to chronic bronchitis marijuana use less likely other obstructive lung disease  Orders:  -     Complete Pulmonary Function Test Pre/Post Bronchodialator (Spirometry Pre/Post/DLCO/Lung Volumes); Future  -     Transthoracic Echo (TTE) Complete; Future  -     Referral to Pulmonology; Future  Orthopnea  Comments:  Rule out CHF more likely related to marijuana use bronchitis  Orders:  -     Transthoracic Echo (TTE) Complete; " Future  Closed nondisplaced fracture of left patella, unspecified fracture morphology, sequela  -     Referral to Orthopaedic Surgery; Future    Discontinue all marijuana use smoking this is likely causing most of your respiratory symptoms    Call to complete sleep study as ordered recommend calling to try to get scheduled earlier and sooner  Call and follow-up with sleep medicine      Use lotion on the hands twice a day    Call follow-up with ophthalmologist    Screening blood work due April 2025    Thank you for making appointment today Jona    Please follow-up 3 months    Jese Ferreira DO, GISSELLE Ferreira DO

## 2024-07-16 ENCOUNTER — HOSPITAL ENCOUNTER (OUTPATIENT)
Dept: RADIOLOGY | Facility: CLINIC | Age: 24
End: 2024-07-16
Payer: COMMERCIAL

## 2024-07-16 ENCOUNTER — APPOINTMENT (OUTPATIENT)
Dept: ORTHOPEDIC SURGERY | Facility: CLINIC | Age: 24
End: 2024-07-16
Payer: COMMERCIAL

## 2024-08-05 ENCOUNTER — APPOINTMENT (OUTPATIENT)
Dept: PULMONOLOGY | Facility: CLINIC | Age: 24
End: 2024-08-05
Payer: COMMERCIAL

## 2024-10-23 ENCOUNTER — APPOINTMENT (OUTPATIENT)
Dept: OPHTHALMOLOGY | Facility: CLINIC | Age: 24
End: 2024-10-23
Payer: COMMERCIAL

## 2025-04-30 ENCOUNTER — CLINICAL SUPPORT (OUTPATIENT)
Dept: SLEEP MEDICINE | Facility: CLINIC | Age: 25
End: 2025-04-30
Payer: COMMERCIAL

## 2025-04-30 DIAGNOSIS — R06.81 APNEA: ICD-10-CM

## 2025-05-01 VITALS
OXYGEN SATURATION: 100 % | DIASTOLIC BLOOD PRESSURE: 87 MMHG | BODY MASS INDEX: 30.22 KG/M2 | HEIGHT: 72 IN | RESPIRATION RATE: 17 BRPM | WEIGHT: 223.11 LBS | SYSTOLIC BLOOD PRESSURE: 130 MMHG | HEART RATE: 70 BPM

## 2025-05-01 NOTE — PROGRESS NOTES
[facility] TECH NOTE:     Patient: Jona Drew   MRN//AGE: [id number]  2000  24 y.o.   Technologist: DEACON POTTER   Room: 441A   Service Date: 2025        Sleep Testing Location: [facility]:     TECHNOLOGIST SLEEP STUDY PROCEDURE NOTE:   This sleep study is being conducted according to the policies and procedures outlined by the AAS accreditation standards.  The sleep study procedure and processes involved during this appointment was explained to the patient/patient’s family, questions were answered. The patient/family verbalized understanding.      The patient is a 24 y.o. year old male scheduled for aPSG with montage of: PSG. he arrived for his appointment.      The study that was ultimately completed was a Diagnostic PSG Split night with montage of: PSG.    The full study Was completed.  Patient questionnaires completed?: yes     Consents signed? yes    Initial Fall Risk Screening:     Jona has not fallen in the last 6 months. his fall did not result in injury. Jona does not have a fear of falling. He does not need assistance with sitting, standing, or walking. he does not need assistance walking in his home. he does not need assistance in an unfamiliar setting. The patient is not using an assistive device.     Brief Study observations: The patient became a Split night AHI 10 due to SAO2 going below 75 during diagnostic portion. Snoring, O2 Desats, Respiratory events, Frequent arousals observed throughout study.      Deviation to order/protocol and reason: N/A      If PAP, which was preferred mask/pressure/mode: F&P Simplus FFM: Medium     Other:None    After the procedure, the patient/family was informed to ensure followup with ordering clinician for testing results.      Technologist: DEACON POTTER

## 2025-05-14 DIAGNOSIS — G47.33 OSA (OBSTRUCTIVE SLEEP APNEA): Primary | ICD-10-CM
